# Patient Record
Sex: FEMALE | Race: WHITE | URBAN - METROPOLITAN AREA
[De-identification: names, ages, dates, MRNs, and addresses within clinical notes are randomized per-mention and may not be internally consistent; named-entity substitution may affect disease eponyms.]

---

## 2018-08-20 ENCOUNTER — OFFICE VISIT (OUTPATIENT)
Dept: FAMILY MEDICINE CLINIC | Age: 43
End: 2018-08-20

## 2018-08-20 ENCOUNTER — HOSPITAL ENCOUNTER (OUTPATIENT)
Dept: LAB | Age: 43
Discharge: HOME | End: 2018-08-20
Payer: MEDICAID

## 2018-08-20 VITALS
DIASTOLIC BLOOD PRESSURE: 69 MMHG | BODY MASS INDEX: 23.22 KG/M2 | OXYGEN SATURATION: 98 % | TEMPERATURE: 98 F | HEIGHT: 69 IN | SYSTOLIC BLOOD PRESSURE: 115 MMHG | RESPIRATION RATE: 18 BRPM | HEART RATE: 99 BPM | WEIGHT: 156.8 LBS

## 2018-08-20 DIAGNOSIS — M62.830 MUSCLE SPASM OF BACK: ICD-10-CM

## 2018-08-20 DIAGNOSIS — Z79.899 HIGH RISK MEDICATION USE: ICD-10-CM

## 2018-08-20 DIAGNOSIS — L30.1 DYSHIDROTIC ECZEMA: ICD-10-CM

## 2018-08-20 DIAGNOSIS — L24.7 IRRITANT CONTACT DERMATITIS DUE TO PLANTS, EXCEPT FOOD: Primary | ICD-10-CM

## 2018-08-20 DIAGNOSIS — B35.1 ONYCHOMYCOSIS: ICD-10-CM

## 2018-08-20 LAB
ALBUMIN SERPL-MCNC: 4 G/DL (ref 3.4–5)
ALBUMIN/GLOB SERPL: 1.4 {RATIO} (ref 0.8–1.7)
ALP SERPL-CCNC: 51 U/L (ref 45–117)
ALT SERPL-CCNC: 17 U/L (ref 13–56)
ANION GAP SERPL CALC-SCNC: 9 MMOL/L (ref 3–18)
AST SERPL-CCNC: 16 U/L (ref 15–37)
BILIRUB SERPL-MCNC: 0.5 MG/DL (ref 0.2–1)
BUN SERPL-MCNC: 11 MG/DL (ref 7–18)
BUN/CREAT SERPL: 13 (ref 12–20)
CALCIUM SERPL-MCNC: 8.7 MG/DL (ref 8.5–10.1)
CHLORIDE SERPL-SCNC: 108 MMOL/L (ref 100–108)
CO2 SERPL-SCNC: 26 MMOL/L (ref 21–32)
CREAT SERPL-MCNC: 0.83 MG/DL (ref 0.6–1.3)
GLOBULIN SER CALC-MCNC: 2.9 G/DL (ref 2–4)
GLUCOSE SERPL-MCNC: 105 MG/DL (ref 74–99)
POTASSIUM SERPL-SCNC: 3.9 MMOL/L (ref 3.5–5.5)
PROT SERPL-MCNC: 6.9 G/DL (ref 6.4–8.2)
SODIUM SERPL-SCNC: 143 MMOL/L (ref 136–145)

## 2018-08-20 PROCEDURE — 80053 COMPREHEN METABOLIC PANEL: CPT | Performed by: FAMILY MEDICINE

## 2018-08-20 RX ORDER — BETAMETHASONE DIPROPIONATE 0.5 MG/G
CREAM TOPICAL 2 TIMES DAILY
Qty: 45 G | Refills: 2 | Status: SHIPPED | OUTPATIENT
Start: 2018-08-20

## 2018-08-20 RX ORDER — KETOCONAZOLE 200 MG/1
200 TABLET ORAL DAILY
Qty: 42 TAB | Refills: 0 | Status: SHIPPED | OUTPATIENT
Start: 2018-08-20 | End: 2018-08-20 | Stop reason: CLARIF

## 2018-08-20 RX ORDER — TERBINAFINE HYDROCHLORIDE 250 MG/1
250 TABLET ORAL DAILY
Qty: 42 TAB | Refills: 0 | Status: SHIPPED | OUTPATIENT
Start: 2018-08-20

## 2018-08-20 NOTE — PATIENT INSTRUCTIONS
For the arms and the fingers, apply the cream twice daily as needed. For the nails, assuming your liver tests come back ok tomorrow, take the lamisil tablet once daily for 6 weeks. For the back, massage is good. Recheck as needed.

## 2018-08-20 NOTE — PROGRESS NOTES
HISTORY OF PRESENT ILLNESS  Laureen Morel is a 37 y.o. female. HPI Comments: Ms. Gauri Hsu is here to establish care for a few reasons. She is a , her hands are always in water (especially her R one) and she is always breaking out in between her fingers. Then, over the weekend she was working in the yard and now is breaking out on her arms. Thirdly, the fingernails on her R hand look abnormal, compared to the ones on her L hand. Finally, there is an area on her back that is sore, she wants to make sure it's nothing to worry about. No known injury, although she does work out regularly. Rash    Associated symptoms include itching. Nail Problem   Pertinent negatives include no chest pain, no abdominal pain, no headaches and no shortness of breath. Back Pain    Pertinent negatives include no chest pain, no fever, no headaches, no abdominal pain and no dysuria. Review of Systems   Constitutional: Negative for chills and fever. HENT: Negative for hearing loss and sore throat. Eyes: Negative for blurred vision and double vision. Respiratory: Negative for cough and shortness of breath. Cardiovascular: Negative for chest pain and palpitations. Gastrointestinal: Negative for abdominal pain, nausea and vomiting. Genitourinary: Negative for dysuria. Musculoskeletal: Positive for back pain and myalgias. Negative for falls, joint pain and neck pain. Skin: Positive for itching and rash. Neurological: Negative for headaches. Physical Exam   Constitutional: Vital signs are normal. She appears well-developed and well-nourished. HENT:   Right Ear: Tympanic membrane and ear canal normal.   Left Ear: Tympanic membrane and ear canal normal.   Nose: Nose normal.   Mouth/Throat: Uvula is midline, oropharynx is clear and moist and mucous membranes are normal.   Eyes: Pupils are equal, round, and reactive to light. Cardiovascular: Normal rate and regular rhythm.     Pulmonary/Chest: Effort normal and breath sounds normal. No respiratory distress. She has no wheezes. Musculoskeletal:   Paravertebral muscles in lumbar area tight   Skin: Rash noted. Red lesions on anterior forearms, some in linear distribution. Cracked, red areas on the insides of her fingers. R thumbnail and index and middle fingernail have onychomycosis   Nursing note and vitals reviewed. ASSESSMENT and PLAN    ICD-10-CM ICD-9-CM    1. Irritant contact dermatitis due to plants, except food L24.7 692.6 augmented betamethasone dipropionate (DIPROLENE-AF) 0.05 % topical cream   2. Dyshidrotic eczema L30.1 705.81 augmented betamethasone dipropionate (DIPROLENE-AF) 0.05 % topical cream   3. Onychomycosis B35.1 110.1 DISCONTINUED: ketoconazole (NIZORAL) 200 mg tablet   4. High risk medication use M83.654 M83.86 METABOLIC PANEL, COMPREHENSIVE   5.  Muscle spasm of back M62.830 724.8        AVS instructions reviewed with patient, pt verbalized understanding

## 2018-08-20 NOTE — PROGRESS NOTES
Rm:2    Chief Complaint   Patient presents with    Rash     that started on Sunday on both forearms    Nail Problem     pt presents for possible nail fungus    Back Pain     pt states spine hurts     Depression Screening:  PHQ over the last two weeks 8/20/2018   Little interest or pleasure in doing things Not at all   Feeling down, depressed, irritable, or hopeless Not at all   Total Score PHQ 2 0       Learning Assessment:  Learning Assessment 8/20/2018   PRIMARY LEARNER Patient   HIGHEST LEVEL OF EDUCATION - PRIMARY LEARNER  SOME COLLEGE   PRIMARY LANGUAGE ENGLISH   LEARNER PREFERENCE PRIMARY DEMONSTRATION   ANSWERED BY patient   RELATIONSHIP SELF       Abuse Screening:  No flowsheet data found. Health Maintenance reviewed and discussed per provider: yes     Coordination of Care:    1. Have you been to the ER, urgent care clinic since your last visit? Hospitalized since your last visit? no    2. Have you seen or consulted any other health care providers outside of the [address] [place] [address] since your last visit? Include any pap smears or colon screening.  no

## 2018-08-20 NOTE — MR AVS SNAPSHOT
[address] [place] [address] [phone] [id number] 
[phone] Patient: Kevan Ambrosio MRN: [id number] YTX:[id number] Visit Information Date & Time Provider Department Dept. Phone Encounter #  
 8/20/2018  2:15 PM Suyapa Long, [address] [phone] [id number] Upcoming Health Maintenance Date Due DTaP/Tdap/Td series (1 - Tdap) 1/30/1996 PAP AKA CERVICAL CYTOLOGY 1/30/1996 Influenza Age 5 to Adult 8/1/2018 Allergies as of 8/20/2018  Review Complete On: 8/20/2018 By: Radha Sharma LPN No Known Allergies Current Immunizations  Never Reviewed No immunizations on file. Not reviewed this visit You Were Diagnosed With   
  
 Codes Comments Irritant contact dermatitis due to plants, except food    -  Primary ICD-10-CM: L24.7 ICD-9-CM: 692.6 Dyshidrotic eczema     ICD-10-CM: L30.1 ICD-9-CM: 705.81 Onychomycosis     ICD-10-CM: B35.1 ICD-9-CM: 110.1 High risk medication use     ICD-10-CM: Z79.899 ICD-9-CM: V58.69 Muscle spasm of back     ICD-10-CM: L86.814 ICD-9-CM: 724.8 Vitals BP Pulse Temp Resp Height(growth percentile) Weight(growth percentile)  
 115/69 (BP 1 Location: Left arm, BP Patient Position: Sitting) 99 98 °F (36.7 °C) (Oral) 18 5' 9\" (1.753 m) 156 lb 12.8 oz (71.1 kg) LMP SpO2 BMI OB Status Smoking Status 07/15/2018 (Exact Date) 98% 23.16 kg/m2 Having regular periods Current Every Day Smoker Vitals History BMI and BSA Data Body Mass Index Body Surface Area  
 23.16 kg/m 2 1.86 m 2 Preferred Pharmacy Pharmacy Name Phone [facility] [phone], [address] [phone] Your Updated Medication List  
  
   
This list is accurate as of 8/20/18  2:38 PM.  Always use your most recent med list.  
  
  
  
  
 augmented betamethasone dipropionate 0.05 % topical cream  
Commonly known as:  Liss Moat Apply  to affected area two (2) times a day. (hands and arm rash)  
  
 terbinafine HCl 250 mg tablet Commonly known as:  LAMISIL Take 1 Tab by mouth daily. Indications: FINGERNAIL ONYCHOMYCOSIS Prescriptions Sent to Pharmacy Refills  
 augmented betamethasone dipropionate (DIPROLENE-AF) 0.05 % topical cream 2 Sig: Apply  to affected area two (2) times a day. (hands and arm rash) Class: Normal  
 Pharm[place] [facility] [address] [place] [address], [address] [place] [address] #: [phone] Route: Topical  
 terbinafine HCl (LAMISIL) 250 mg tablet 0 Sig: Take 1 Tab by mouth daily. Indications: FINGERNAIL ONYCHOMYCOSIS Class: Normal  
 Pharmacy: [facility] [address], [address] [place] [address] #: [phone] Route: Oral  
  
Patient Instructions For the arms and the fingers, apply the cream twice daily as needed. For the nails, assuming your liver tests come back ok tomorrow, take the lamisil tablet once daily for 6 weeks. For the back, massage is good. Recheck as needed. Introducing [facility]! Kiersten Joel introduces As Seen on [employer] patient portal. Now you can access parts of your medical record, email your doctor's office, and request medication refills online. 1. In your internet browser, go to [email or web address] 2. Click on the First Time User? Click Here link in the Sign In box. You will see the New Member Sign Up page. 3. Enter your As Seen on [employer] Access Code exactly as it appears below. You will not need to use this code after youve completed the sign-up process. If you do not sign up before the expiration date, you must request a new code. · As Seen on TV Access Code: [email or web address] Expires: 11/18/2018  2:38 PM 
 
 4. Enter the last four digits of your Social Security Number (xxxx) and Date of Birth (mm/dd/yyyy) as indicated and click Submit. You will be taken to the next sign-up page. 5. Create a Senstore ID. This will be your [employer]store login ID and cannot be changed, so think of one that is secure and easy to remember. 6. Create a [employer]store password. You can change your password at any time. 7. Enter your Password Reset Question and Answer. This can be used at a later time if you forget your password. 8. Enter your e-mail address. You will receive e-mail notification when new information is available in [address] 9. Click Sign Up. You can now view and download portions of your medical record. 10. Click the Download Summary menu link to download a portable copy of your medical information. If you have questions, please visit the Frequently Asked Questions section of the [employer] website. Remember, Senstore is NOT to be used for urgent needs. For medical emergencies, dial 911. Now available from your iPhone and Android! Please provide this summary of care documentation to your next provider. If you have any questions after today's visit, please call [phone].

## 2018-08-21 ENCOUNTER — TELEPHONE (OUTPATIENT)
Dept: FAMILY MEDICINE CLINIC | Age: 43
End: 2018-08-21

## 2018-08-21 NOTE — TELEPHONE ENCOUNTER
Called patient, verified name and date of birth. Informed patient of their lab results. Patient verbalized understanding and had no further questions.